# Patient Record
Sex: MALE | Race: WHITE | NOT HISPANIC OR LATINO | Employment: FULL TIME | URBAN - METROPOLITAN AREA
[De-identification: names, ages, dates, MRNs, and addresses within clinical notes are randomized per-mention and may not be internally consistent; named-entity substitution may affect disease eponyms.]

---

## 2019-02-11 ENCOUNTER — OCCUPATIONAL HEALTH (OUTPATIENT)
Dept: URGENT CARE | Facility: CLINIC | Age: 54
End: 2019-02-11

## 2019-02-11 DIAGNOSIS — Z02.89 ENCOUNTER FOR PHYSICAL EXAMINATION RELATED TO EMPLOYMENT: ICD-10-CM

## 2019-02-11 PROCEDURE — 99499 PR PHYSICAL - DOT/CDL: ICD-10-PCS | Mod: S$GLB,,, | Performed by: NURSE PRACTITIONER

## 2019-02-11 PROCEDURE — 99499 UNLISTED E&M SERVICE: CPT | Mod: S$GLB,,, | Performed by: NURSE PRACTITIONER

## 2019-08-10 ENCOUNTER — HOSPITAL ENCOUNTER (EMERGENCY)
Facility: HOSPITAL | Age: 54
Discharge: HOME | End: 2019-08-10
Attending: EMERGENCY MEDICINE
Payer: COMMERCIAL

## 2019-08-10 VITALS
DIASTOLIC BLOOD PRESSURE: 76 MMHG | TEMPERATURE: 98 F | BODY MASS INDEX: 25.9 KG/M2 | SYSTOLIC BLOOD PRESSURE: 146 MMHG | OXYGEN SATURATION: 96 % | HEIGHT: 71 IN | RESPIRATION RATE: 17 BRPM | WEIGHT: 185 LBS | HEART RATE: 70 BPM

## 2019-08-10 DIAGNOSIS — S91.311A FOOT LACERATION, RIGHT, INITIAL ENCOUNTER: Primary | ICD-10-CM

## 2019-08-10 PROCEDURE — 90471 IMMUNIZATION ADMIN: CPT | Performed by: EMERGENCY MEDICINE

## 2019-08-10 PROCEDURE — 63600175 PHARM REV CODE 636 W HCPCS: Performed by: EMERGENCY MEDICINE

## 2019-08-10 PROCEDURE — 12002 RPR S/N/AX/GEN/TRNK2.6-7.5CM: CPT

## 2019-08-10 PROCEDURE — 99284 EMERGENCY DEPT VISIT MOD MDM: CPT | Mod: 25

## 2019-08-10 PROCEDURE — 90715 TDAP VACCINE 7 YRS/> IM: CPT | Performed by: EMERGENCY MEDICINE

## 2019-08-10 PROCEDURE — 25000003 PHARM REV CODE 250: Performed by: EMERGENCY MEDICINE

## 2019-08-10 RX ORDER — CEPHALEXIN 500 MG/1
500 CAPSULE ORAL EVERY 8 HOURS
Qty: 9 CAPSULE | Refills: 0 | Status: SHIPPED | OUTPATIENT
Start: 2019-08-10 | End: 2019-08-10 | Stop reason: SDUPTHER

## 2019-08-10 RX ORDER — HYDROCODONE BITARTRATE AND ACETAMINOPHEN 5; 325 MG/1; MG/1
1 TABLET ORAL EVERY 4 HOURS PRN
Qty: 8 TABLET | Refills: 0 | Status: SHIPPED | OUTPATIENT
Start: 2019-08-10 | End: 2019-08-20

## 2019-08-10 RX ORDER — CEPHALEXIN 500 MG/1
500 CAPSULE ORAL EVERY 8 HOURS
Qty: 9 CAPSULE | Refills: 0 | Status: SHIPPED | OUTPATIENT
Start: 2019-08-10 | End: 2019-08-13

## 2019-08-10 RX ORDER — LIDOCAINE HYDROCHLORIDE 10 MG/ML
10 INJECTION INFILTRATION; PERINEURAL
Status: COMPLETED | OUTPATIENT
Start: 2019-08-10 | End: 2019-08-10

## 2019-08-10 RX ADMIN — LIDOCAINE HYDROCHLORIDE 10 ML: 10 INJECTION, SOLUTION INFILTRATION; PERINEURAL at 08:08

## 2019-08-10 RX ADMIN — CLOSTRIDIUM TETANI TOXOID ANTIGEN (FORMALDEHYDE INACTIVATED), CORYNEBACTERIUM DIPHTHERIAE TOXOID ANTIGEN (FORMALDEHYDE INACTIVATED), BORDETELLA PERTUSSIS TOXOID ANTIGEN (GLUTARALDEHYDE INACTIVATED), BORDETELLA PERTUSSIS FILAMENTOUS HEMAGGLUTININ ANTIGEN (FORMALDEHYDE INACTIVATED), BORDETELLA PERTUSSIS PERTACTIN ANTIGEN, AND BORDETELLA PERTUSSIS FIMBRIAE 2/3 ANTIGEN 0.5 ML: 5; 2; 2.5; 5; 3; 5 INJECTION, SUSPENSION INTRAMUSCULAR at 09:08

## 2019-08-11 NOTE — ED NOTES
Pt presents to the ED with complaints of laceration to plantar aspect of his R foot which occurred PTA tonight while ripping up tile floors barefoot. Bed rails are up and call light is within patient reach. Will continue to monitor.

## 2019-08-11 NOTE — ED PROVIDER NOTES
Encounter Date: 8/10/2019    SCRIBE #1 NOTE: IBetzaida and am scribing for, and in the presence of, José Miguel Martinez MD.       History     Chief Complaint   Patient presents with    Laceration     Bottom of Rt foot on tile      Time seen by provider: 8:16 PM on 08/10/2019    Deniz Mckee is a 54 y.o. male who presents to the ED with an onset of cut on the bottom of his right foot s/p an injury occurring 1 hour PTA. The patient reports that he was pulling up tile while barefoot, and he cut his foot on the tile. He has been drinking alcohol. He has not had a tetanus shot in the past 10 years. The patient denies any other symptoms at this time. No PMHx. No PSHx of the right foot. Patient is a current smoker. No known drug allergies noted. No foreign body sensation.    The history is provided by the patient.     Review of patient's allergies indicates:  No Known Allergies  History reviewed. No pertinent past medical history.  History reviewed. No pertinent surgical history.  History reviewed. No pertinent family history.  Social History     Tobacco Use    Smoking status: Current Every Day Smoker     Types: Cigarettes    Smokeless tobacco: Never Used    Tobacco comment: QUIT 3 DAYS AGO   Substance Use Topics    Alcohol use: Not on file    Drug use: Not on file     Review of Systems   Musculoskeletal: Positive for gait problem (Secondary to laceration).   Skin: Positive for wound.   Neurological: Negative for weakness and numbness.       Physical Exam     Initial Vitals [08/10/19 1938]   BP Pulse Resp Temp SpO2   (!) 146/76 70 17 98.2 °F (36.8 °C) 96 %      MAP       --         Physical Exam    Nursing note and vitals reviewed.  Constitutional: He appears well-developed and well-nourished. He is not diaphoretic.  Non-toxic appearance. He does not have a sickly appearance. He does not appear ill. No distress.   HENT:   Head: Normocephalic and atraumatic.   Eyes: EOM are normal.   Neck: Normal  range of motion. Neck supple. Normal range of motion present. No neck rigidity.   Pulmonary/Chest: No respiratory distress.   Musculoskeletal: Normal range of motion.   Normal ability to flex and extend all toes of the right foot   Neurological: He is alert and oriented to person, place, and time.   Normal sensation to light touch distal to the laceration   Skin: Skin is warm and dry. Laceration noted. No rash noted.   5.5 cm large semi-[place] flap laceration at the base of the 1st and 2nd phalangeal joint.  No apparent foreign body.   Psychiatric: He has a normal mood and affect. His behavior is normal. Judgment and thought content normal.                 ED Course   Lac Repair  Date/Time: 8/10/2019 8:59 PM  Performed by: José Miguel Martinez MD  Authorized by: José Miguel Martinez MD   Consent Done: Yes  Body area: lower extremity  Location details: right foot  Laceration length: 5.5 cm  Foreign bodies: no foreign bodies  Anesthesia: local infiltration    Anesthesia:  Local Anesthetic: lidocaine 1% without epinephrine  Anesthetic total: 8 mL  Patient sedated: no  Preparation: Patient was prepped and draped in the usual sterile fashion.  Irrigation solution: saline  Irrigation method: syringe  Amount of cleaning: extensive  Skin closure: Ethilon (3-0)  Number of sutures: 7  Patient tolerance: Patient tolerated the procedure well with no immediate complications            Labs Reviewed - No data to display       Imaging Results          X-Ray Foot Complete Right (In process)                  Medical Decision Making:   History:   Old Medical Records: I decided to obtain old medical records.  Clinical Tests:   Radiological Study: Ordered and Reviewed            Scribe Attestation:   Scribe #1: I performed the above scribed service and the documentation accurately describes the services I performed. I attest to the accuracy of the note.    I, Dr. Martinez, personally performed the services described in this documentation. All  medical record entries made by the scribe were at my direction and in my presence.  I have reviewed the chart and agree that the record reflects my personal performance and is accurate and complete.9:54 PM 08/10/2019            ED Course as of Aug 10 2152   Sat Aug 10, 2019   2015 BP(!): 146/76 [EF]   2015 Temp: 98.2 °F (36.8 °C) [EF]   2015 Temp src: Oral [EF]   2015 Pulse: 70 [EF]   2015 Resp: 17 [EF]   2015 SpO2: 96 % [EF]   2058 R foot No visible fracture or dislocation. No soft tissue swelling. Visible soft tissue defect.  Consistent with laceration.  No apparent retained foreign body.        [EF]   2103 Patient sustained a right foot laceration while ripping out old tile.  He was barefooted when he did this and has been drinking alcohol.  He denies a foreign body sensation.  None visualized on x-ray.  None seen during wound exploration.  Able flex and extend toes normally.  Wound does not extend into the flexor tendons of the foot.  No tendon visualized.  No apparent retained foreign body.  Wound explored in a bloodless field.  Copious irrigation 1 L prior to laceration repair.  Seven sutures placed.  Wound bandaged, patient given crutches and note for work.  Excused from work until suture removal and wound recheck in 1 week.  Given that this was a foot wound sustained while working in a bathroom the patient will be placed on several days of antibiotics.  Advised of risk of infection or wound dehiscence or retained foreign body.  Patient works as a .  Advised not to bear weight on right foot or go back to work until cleared by primary care.    [EF]      ED Course User Index  [EF] José Miguel Martinez MD     Clinical Impression:       ICD-10-CM ICD-9-CM   1. Foot laceration, right, initial encounter S91.311A 892.0         Disposition:   Disposition: Discharged  Condition: Stable                        José Miguel Martinez MD  08/10/19 2154

## 2021-03-22 ENCOUNTER — OFFICE VISIT (OUTPATIENT)
Dept: FAMILY MEDICINE | Facility: CLINIC | Age: 56
End: 2021-03-22
Payer: COMMERCIAL

## 2021-03-22 VITALS
TEMPERATURE: 98 F | OXYGEN SATURATION: 97 % | HEIGHT: 71 IN | BODY MASS INDEX: 25.39 KG/M2 | DIASTOLIC BLOOD PRESSURE: 80 MMHG | HEART RATE: 71 BPM | SYSTOLIC BLOOD PRESSURE: 128 MMHG | WEIGHT: 181.38 LBS

## 2021-03-22 DIAGNOSIS — Z00.00 HEALTHCARE MAINTENANCE: ICD-10-CM

## 2021-03-22 DIAGNOSIS — E78.00 PURE HYPERCHOLESTEROLEMIA: ICD-10-CM

## 2021-03-22 DIAGNOSIS — K21.9 GASTROESOPHAGEAL REFLUX DISEASE, UNSPECIFIED WHETHER ESOPHAGITIS PRESENT: ICD-10-CM

## 2021-03-22 DIAGNOSIS — Z76.89 ENCOUNTER TO ESTABLISH CARE: Primary | ICD-10-CM

## 2021-03-22 DIAGNOSIS — Z12.5 SCREENING FOR MALIGNANT NEOPLASM OF PROSTATE: ICD-10-CM

## 2021-03-22 PROCEDURE — 99204 PR OFFICE/OUTPT VISIT, NEW, LEVL IV, 45-59 MIN: ICD-10-PCS | Mod: S$GLB,,, | Performed by: NURSE PRACTITIONER

## 2021-03-22 PROCEDURE — 3008F PR BODY MASS INDEX (BMI) DOCUMENTED: ICD-10-PCS | Mod: S$GLB,,, | Performed by: NURSE PRACTITIONER

## 2021-03-22 PROCEDURE — 1126F PR PAIN SEVERITY QUANTIFIED, NO PAIN PRESENT: ICD-10-PCS | Mod: S$GLB,,, | Performed by: NURSE PRACTITIONER

## 2021-03-22 PROCEDURE — 1126F AMNT PAIN NOTED NONE PRSNT: CPT | Mod: S$GLB,,, | Performed by: NURSE PRACTITIONER

## 2021-03-22 PROCEDURE — 99204 OFFICE O/P NEW MOD 45 MIN: CPT | Mod: S$GLB,,, | Performed by: NURSE PRACTITIONER

## 2021-03-22 PROCEDURE — 3008F BODY MASS INDEX DOCD: CPT | Mod: S$GLB,,, | Performed by: NURSE PRACTITIONER

## 2021-03-22 RX ORDER — OMEPRAZOLE 20 MG/1
CAPSULE, DELAYED RELEASE ORAL
COMMUNITY
End: 2021-03-22

## 2021-03-22 RX ORDER — HYDROCORTISONE 1 %
GEL (GRAM) TOPICAL
COMMUNITY

## 2021-03-22 RX ORDER — PANTOPRAZOLE SODIUM 40 MG/1
TABLET, DELAYED RELEASE ORAL
COMMUNITY
End: 2021-03-22

## 2021-03-22 RX ORDER — ATORVASTATIN CALCIUM 20 MG/1
TABLET, FILM COATED ORAL
COMMUNITY
End: 2021-03-22

## 2021-03-22 RX ORDER — SILDENAFIL 100 MG/1
TABLET, FILM COATED ORAL
COMMUNITY

## 2021-03-22 RX ORDER — NAPROXEN SODIUM 220 MG/1
81 TABLET, FILM COATED ORAL DAILY
COMMUNITY

## 2021-03-22 RX ORDER — PYRIDOXINE HCL (VITAMIN B6) 250 MG
250 TABLET ORAL DAILY
COMMUNITY

## 2021-03-22 RX ORDER — BUPROPION HYDROCHLORIDE 150 MG/1
TABLET, EXTENDED RELEASE ORAL
COMMUNITY
End: 2021-03-22

## 2024-03-03 ENCOUNTER — OFFICE VISIT (OUTPATIENT)
Dept: URGENT CARE | Facility: CLINIC | Age: 59
End: 2024-03-03
Payer: COMMERCIAL

## 2024-03-03 VITALS
HEART RATE: 70 BPM | RESPIRATION RATE: 18 BRPM | TEMPERATURE: 98 F | DIASTOLIC BLOOD PRESSURE: 81 MMHG | BODY MASS INDEX: 24.78 KG/M2 | SYSTOLIC BLOOD PRESSURE: 117 MMHG | OXYGEN SATURATION: 96 % | HEIGHT: 71 IN | WEIGHT: 177 LBS

## 2024-03-03 DIAGNOSIS — R09.81 SINUS CONGESTION: Primary | ICD-10-CM

## 2024-03-03 DIAGNOSIS — U07.1 COVID-19 VIRUS DETECTED: ICD-10-CM

## 2024-03-03 DIAGNOSIS — U07.1 COVID-19 VIRUS INFECTION: ICD-10-CM

## 2024-03-03 PROBLEM — M13.0 POLYARTHRITIS: Status: ACTIVE | Noted: 2022-06-07

## 2024-03-03 LAB
CTP QC/QA: YES
CTP QC/QA: YES
FLUAV AG NPH QL: NEGATIVE
FLUBV AG NPH QL: NEGATIVE
SARS-COV-2 AG RESP QL IA.RAPID: POSITIVE

## 2024-03-03 PROCEDURE — 87804 INFLUENZA ASSAY W/OPTIC: CPT | Mod: QW,,, | Performed by: NURSE PRACTITIONER

## 2024-03-03 PROCEDURE — 99204 OFFICE O/P NEW MOD 45 MIN: CPT | Mod: S$GLB,,, | Performed by: NURSE PRACTITIONER

## 2024-03-03 PROCEDURE — 87811 SARS-COV-2 COVID19 W/OPTIC: CPT | Mod: QW,S$GLB,, | Performed by: NURSE PRACTITIONER

## 2024-03-03 NOTE — PROGRESS NOTES
"Subjective:      Patient ID: Deniz Mckee is a 58 y.o. male.    Vitals:  height is 5' 11" (1.803 m) and weight is 80.3 kg (177 lb). His temperature is 97.7 °F (36.5 °C). His blood pressure is 117/81 and his pulse is 70. His respiration is 18 and oxygen saturation is 96%.     Chief Complaint: Sinus Problem    Onset of symptoms early Friday, 2 days ago    Sinus Problem  This is a new problem. The current episode started in the past 7 days. The problem has been gradually worsening since onset. There has been no fever. Associated symptoms include congestion and coughing. Pertinent negatives include no chills, headaches, shortness of breath or sore throat. Past treatments include oral decongestants. The treatment provided no relief.       Constitution: Negative for appetite change, chills, fatigue and fever.   HENT:  Positive for congestion. Negative for sore throat.    Respiratory:  Positive for cough. Negative for chest tightness, shortness of breath and wheezing.    Gastrointestinal:  Negative for nausea, vomiting and diarrhea.   Musculoskeletal:  Negative for muscle ache.   Skin:  Negative for rash.   Neurological:  Negative for headaches.      Objective:     Physical Exam   Constitutional: He is oriented to person, place, and time. He is cooperative.  Non-toxic appearance. He does not appear ill. No distress. awake  HENT:   Head: Normocephalic and atraumatic.   Ears:   Right Ear: External ear and ear canal normal. Tympanic membrane is bulging. Tympanic membrane is not erythematous.   Left Ear: External ear and ear canal normal. Tympanic membrane is bulging. Tympanic membrane is not erythematous.   Nose: Rhinorrhea and congestion present.   Mouth/Throat: Mucous membranes are moist. Posterior oropharyngeal erythema present. No oropharyngeal exudate.   Eyes: Conjunctivae are normal. Right eye exhibits no discharge. Left eye exhibits no discharge.   Neck: Neck supple. No neck rigidity present.   Cardiovascular: " Normal rate, regular rhythm and normal heart sounds.   Pulmonary/Chest: Effort normal and breath sounds normal. No accessory muscle usage. No tachypnea. No respiratory distress. He has no wheezes. He has no rhonchi. He has no rales. He exhibits no tenderness.   Abdominal: Normal appearance.   Musculoskeletal:      Cervical back: He exhibits no tenderness.   Lymphadenopathy:     He has no cervical adenopathy.   Neurological: no focal deficit. He is alert and oriented to person, place, and time. No sensory deficit.   Skin: Skin is warm, dry, not diaphoretic and no rash. Capillary refill takes 2 to 3 seconds.   Psychiatric: His behavior is normal. Mood normal.   Nursing note and vitals reviewed.      Assessment:     1. Sinus congestion    2. COVID-19 virus infection    3. COVID-19 virus detected      COVID positive  Flu a/B negative    Plan:       Sinus congestion  -     SARS Coronavirus 2 Antigen, POCT Manual Read  -     POCT Influenza A/B Rapid Antigen    COVID-19 virus infection    COVID-19 virus detected

## 2024-03-03 NOTE — LETTER
March 3, 2024      [facility]  [address]  [place] [address]  Phone: [phone]       Patient: Deniz Mckee   YOB: 1965  Date of Visit: 03/03/2024    To Whom It May Concern:    Joellen Mckee  was at [facility] on 03/03/2024. The patient may return to work/school on 03/06/2024 as long as symptoms are improving and there has been no fever the preceding 24 hours with an additional 5 days of masking while around others. If you have any questions or concerns, or if I can be of further assistance, please do not hesitate to contact me.    Sincerely,    Urszula Anderson, NP

## 2024-03-03 NOTE — PATIENT INSTRUCTIONS
Symptomatic treatment to include:    Rest, increase fluid intake to include 50 % water, 50% electrolyte replacement  Ibuprofen/Tylenol as directed for fever, sore throat, headache, body aches.  Tylenol helps with fever but ibuprofen or aleve helps best for other symptoms.   Always take ibuprofen and or Aleve with food as repeated use can cause stomach irritation.  It is also advised to start taking Pepcid 20 mg over-the-counter twice a day for 7-10 days whenever taking NSAIDs for extended times for stomach protection  Antihistamine of choice, antihistamine nasal spray and topicals steroid nasal spray over-the-counter as directed  Mucinex D over the counter as directed for sinus congestion.  Coricidin HBP if you have high blood pressure.  Warm, salt water gargles, over the counter throat lozenges or sprays as desires.   Liquid benadryl and maalox 1 to 1 concentration, gargle and spit for temporary relief for sore throat.  Imodium over the counter as directed for diarrhea if desired.  ER for difficulty breathing not relieved by rest, excessive lethargy and/or change in mental status    Follow CDC isolation guidelines as provided     Patient Instructions   POSITIVE COVID TEST      You have tested positive for COVID-19 today.  Please note that patients who test positive for COVID-19 are required by the CDC to undergo isolation for 5 days, then wearing a mask around others for an additional 5 days, after their symptoms first began following the new updated guidelines of 12/27/2021. This isolation starts from the day you first developed symptoms, not the day of your positive test. For example, if your symptoms began on a Monday but tested positive on the following Wednesday, your 5-day isolation begins from that Monday, not the Wednesday you tested positive.  However, if you are asymptomatic (a person who does not have any symptoms) and COVID-19 positive, your 5-day isolation begins on the day you tested positive,  regardless of exposure date.  Also, per the CDC guidelines, once your 5 days have passed, symptoms have resolved or are improving, and you have not had fever greater than 100.4F in the last 24 hours without taking any fever reducers such as Tylenol (Acetaminophen) or Motrin (Ibuprofen), you may return to your normal activities including social distancing, wearing masks, and frequent handwashing - YOU DO NOT NEED ANOTHER TEST IN ORDER TO END YOUR QUARANTINE.

## 2024-03-04 ENCOUNTER — PATIENT MESSAGE (OUTPATIENT)
Dept: RESEARCH | Facility: HOSPITAL | Age: 59
End: 2024-03-04
Payer: COMMERCIAL

## 2024-10-09 ENCOUNTER — OFFICE VISIT (OUTPATIENT)
Dept: URGENT CARE | Facility: CLINIC | Age: 59
End: 2024-10-09
Payer: COMMERCIAL

## 2024-10-09 VITALS
SYSTOLIC BLOOD PRESSURE: 136 MMHG | DIASTOLIC BLOOD PRESSURE: 88 MMHG | HEART RATE: 75 BPM | RESPIRATION RATE: 16 BRPM | BODY MASS INDEX: 26.48 KG/M2 | OXYGEN SATURATION: 97 % | TEMPERATURE: 98 F | WEIGHT: 185 LBS | HEIGHT: 70 IN

## 2024-10-09 DIAGNOSIS — R89.4 INFLUENZA A VIRUS NOT DETECTED: ICD-10-CM

## 2024-10-09 DIAGNOSIS — Z20.822 COVID-19 VIRUS NOT DETECTED: ICD-10-CM

## 2024-10-09 DIAGNOSIS — R05.9 COUGH, UNSPECIFIED TYPE: ICD-10-CM

## 2024-10-09 DIAGNOSIS — J06.9 VIRAL URI WITH COUGH: Primary | ICD-10-CM

## 2024-10-09 LAB
CTP QC/QA: YES
CTP QC/QA: YES
FLUAV AG NPH QL: NEGATIVE
FLUBV AG NPH QL: NEGATIVE
SARS-COV-2 AG RESP QL IA.RAPID: NEGATIVE

## 2024-10-09 PROCEDURE — 99214 OFFICE O/P EST MOD 30 MIN: CPT | Mod: S$GLB,,,

## 2024-10-09 PROCEDURE — 87811 SARS-COV-2 COVID19 W/OPTIC: CPT | Mod: QW,S$GLB,,

## 2024-10-09 PROCEDURE — 87804 INFLUENZA ASSAY W/OPTIC: CPT | Mod: QW,,,

## 2024-10-09 RX ORDER — BROMPHENIRAMINE MALEATE, PSEUDOEPHEDRINE HYDROCHLORIDE, AND DEXTROMETHORPHAN HYDROBROMIDE 2; 30; 10 MG/5ML; MG/5ML; MG/5ML
10 SYRUP ORAL 4 TIMES DAILY PRN
Qty: 118 ML | Refills: 0 | Status: SHIPPED | OUTPATIENT
Start: 2024-10-09 | End: 2024-10-19

## 2024-10-09 RX ORDER — PROMETHAZINE HYDROCHLORIDE AND DEXTROMETHORPHAN HYDROBROMIDE 6.25; 15 MG/5ML; MG/5ML
5 SYRUP ORAL NIGHTLY PRN
Qty: 50 ML | Refills: 0 | Status: SHIPPED | OUTPATIENT
Start: 2024-10-09 | End: 2024-10-19

## 2024-10-09 RX ORDER — MELOXICAM 15 MG/1
15 TABLET ORAL
COMMUNITY
Start: 2024-09-26

## 2024-10-09 RX ORDER — BENZONATATE 100 MG/1
100 CAPSULE ORAL 3 TIMES DAILY PRN
Qty: 30 CAPSULE | Refills: 0 | Status: SHIPPED | OUTPATIENT
Start: 2024-10-09 | End: 2024-10-19

## 2024-10-09 NOTE — PATIENT INSTRUCTIONS
Follow up with PCP for chest x-ray orders, or if illness exceeds 10 days since onset.    Continue supportive treatments.    If taking Bromfed, do not take any additional decongestants or antihistamines.  Promethazine DM at nighttime only.  This medication will cause sedation.  Increase fluid intake.  Make sure you stay well-hydrated especially if taking Mucinex.

## 2024-10-09 NOTE — PROGRESS NOTES
"Subjective:      Patient ID: Deniz Mckee is a 59 y.o. male.    Vitals:  height is 5' 10" (1.778 m) and weight is 83.9 kg (185 lb). His oral temperature is 98.3 °F (36.8 °C). His blood pressure is 136/88 and his pulse is 75. His respiration is 16 and oxygen saturation is 97%.     Chief Complaint: URI    Pt presents to clinic with c/o nasal congestion, post nasal drip, and productive cough x2 days.  He denies fevers or known ill contacts.  He was vaccinated for COVID-19, not boosted.  Denies annual influenza vaccinations.  Pt reports taking 1 dose of Augmentin, Dayquil, Nyquil, Mucinex, and Claritin for symptoms w/o relief.  Mucinex does provide mild relief.  Denies shortness or breath.  No GI, or  symptoms.    URI   This is a new problem. The current episode started in the past 7 days (x2 days ago). The problem has been unchanged. There has been no fever. The cough is Productive of sputum. Associated symptoms include congestion, coughing and a sore throat. Pertinent negatives include no diarrhea, dysuria, headaches, joint pain, joint swelling, nausea, sinus pain or wheezing. Treatments tried: Claritin, Nyquil, Dayquil, Mucinex, Augmentin x1. The treatment provided no relief.       Constitution: Negative for fatigue and fever.   HENT:  Positive for congestion, postnasal drip and sore throat. Negative for sinus pain.    Neck: neck negative.   Cardiovascular: Negative.    Eyes: Negative.    Respiratory:  Positive for cough and sputum production. Negative for wheezing.    Gastrointestinal:  Negative for nausea and diarrhea.   Endocrine: negative.   Genitourinary:  Negative for dysuria.   Musculoskeletal: Negative.    Skin: Negative.    Allergic/Immunologic: Positive for immunizations up-to-date.   Neurological:  Negative for headaches.   Hematologic/Lymphatic: Negative.    Psychiatric/Behavioral: Negative.        Objective:     Physical Exam   Constitutional: He is oriented to person, place, and time. He is " cooperative.  Non-toxic appearance. He does not appear ill. No distress. normal  HENT:   Head: Normocephalic and atraumatic.   Ears:   Right Ear: External ear normal.   Left Ear: External ear normal.   Nose: Nose normal. No rhinorrhea or congestion.   Mouth/Throat: Uvula is midline, oropharynx is clear and moist and mucous membranes are normal. Mucous membranes are moist. No oropharyngeal exudate or posterior oropharyngeal erythema. Oropharynx is clear.   Eyes: Conjunctivae and lids are normal. Pupils are equal, round, and reactive to light. Extraocular movement intact   Neck: Trachea normal and phonation normal. Neck supple.   Cardiovascular: Normal rate, regular rhythm, normal heart sounds and normal pulses.   Pulmonary/Chest: Effort normal and breath sounds normal. No stridor. No respiratory distress. He has no wheezes. He has no rhonchi. He has no rales.   Abdominal: Normal appearance.   Musculoskeletal: Normal range of motion.         General: Normal range of motion.   Neurological: no focal deficit. He is alert, oriented to person, place, and time and at baseline. He has normal motor skills, normal sensation and intact cranial nerves (2-12). Gait and coordination normal. GCS eye subscore is 4. GCS verbal subscore is 5. GCS motor subscore is 6.   Skin: Skin is warm, dry and not diaphoretic. Capillary refill takes 2 to 3 seconds.   Psychiatric: He experiences Normal attention and Normal perception. His speech is normal and behavior is normal. Mood, judgment and thought content normal.   Nursing note and vitals reviewed.      Assessment:     1. Cough, unspecified type        Plan:       Cough, unspecified type  -     POCT Influenza A/B  -     SARS Coronavirus 2 Antigen, POCT Manual Read    Other orders  -     brompheniramine-pseudoeph-DM (BROMFED DM) 2-30-10 mg/5 mL Syrp; Take 10 mLs by mouth 4 (four) times daily as needed (Cough).  Dispense: 118 mL; Refill: 0  -     promethazine-dextromethorphan  (PROMETHAZINE-DM) 6.25-15 mg/5 mL Syrp; Take 5 mLs by mouth nightly as needed (night time cough).  Dispense: 50 mL; Refill: 0  -     benzonatate (TESSALON) 100 MG capsule; Take 1 capsule (100 mg total) by mouth 3 (three) times daily as needed for Cough.  Dispense: 30 capsule; Refill: 0        [age] year old male no pertinent past medical history, presenting with complaint of productive cough, congestion, sore throat.  Has been taken numerous over-the-counter medications no relief.  He was requesting antibiotics.  Illnesses acute onset.  Has been afebrile.  No shortness a breath, chest pain, back pain, or pleuritic pains.  Shared medical decision-making discussed likely need for chest x-ray, and politely declined.  He reports his significant other is chief for radiology department at our [facility].  He was in the window for antivirals for COVID-19, and influenza, so recommended evaluation for viral illness.  Do not suspect this is bacterial etiology.      Addendum October 10, 2024 at 8:15 a.m.-chest x-ray entered as patient called this morning and has changed his mind regarding wanting chest x-ray done.  Chest x-ray entered into [facility].  Patient will have x-ray done at our [facility].-sunitha ang jr., weip.